# Patient Record
Sex: MALE | Race: WHITE | NOT HISPANIC OR LATINO
[De-identification: names, ages, dates, MRNs, and addresses within clinical notes are randomized per-mention and may not be internally consistent; named-entity substitution may affect disease eponyms.]

---

## 2024-02-05 ENCOUNTER — NON-APPOINTMENT (OUTPATIENT)
Age: 49
End: 2024-02-05

## 2024-03-18 ENCOUNTER — NON-APPOINTMENT (OUTPATIENT)
Age: 49
End: 2024-03-18

## 2024-05-09 ENCOUNTER — APPOINTMENT (OUTPATIENT)
Dept: ORTHOPEDIC SURGERY | Facility: CLINIC | Age: 49
End: 2024-05-09
Payer: COMMERCIAL

## 2024-05-09 VITALS — BODY MASS INDEX: 23.7 KG/M2 | HEIGHT: 72 IN | WEIGHT: 175 LBS

## 2024-05-09 DIAGNOSIS — Z78.9 OTHER SPECIFIED HEALTH STATUS: ICD-10-CM

## 2024-05-09 DIAGNOSIS — Z86.39 PERSONAL HISTORY OF OTHER ENDOCRINE, NUTRITIONAL AND METABOLIC DISEASE: ICD-10-CM

## 2024-05-09 DIAGNOSIS — S43.005A UNSPECIFIED DISLOCATION OF LEFT SHOULDER JOINT, INITIAL ENCOUNTER: ICD-10-CM

## 2024-05-09 DIAGNOSIS — M19.012 PRIMARY OSTEOARTHRITIS, LEFT SHOULDER: ICD-10-CM

## 2024-05-09 PROBLEM — Z00.00 ENCOUNTER FOR PREVENTIVE HEALTH EXAMINATION: Status: ACTIVE | Noted: 2024-05-09

## 2024-05-09 PROCEDURE — 99203 OFFICE O/P NEW LOW 30 MIN: CPT

## 2024-05-09 PROCEDURE — 73030 X-RAY EXAM OF SHOULDER: CPT | Mod: LT

## 2024-05-09 RX ORDER — ATORVASTATIN CALCIUM 80 MG/1
TABLET, FILM COATED ORAL
Refills: 0 | Status: ACTIVE | COMMUNITY

## 2024-05-09 NOTE — PHYSICAL EXAM
[de-identified] : Examination of the left shoulder is as follows:  INSPECTION: no swelling, no ecchymosis, no erythema, no atrophy, no deformity, no scapular winging. PALPATION: mildly ttp over anterolateral shoulder ROM: did not attempt due to recent dislocation STRENGTH: motor 5/5 ain/pin/ulnar  NEURO: motor and sensory intact distally.   X-rays of the left shoulder is as follows: Shoulder 2+ View: Moderate ac djd. There are no fractures, subluxations or dislocations. No significant abnormalities are seen.

## 2024-05-09 NOTE — ASSESSMENT
[FreeTextEntry1] : 49 yo RHD male presenting s/p left closed shoulder dislocation reduced at [facility] on 5/6/24. X-rays negative for abnormalities. Recommend non-surgical management at this time -LUE WBAT, d/c arm sling. Advised to avoid any overhead movements -Rx PT given today -Discussed with patient that if he continues to have persistent pain that he may be indicated for surgery, patient expressed understanding -Rest, ice, compression, elevation, NSAIDs PRN for pain.  -All questions answered -F/u 6 weeks  The diagnosis was explained in detail. The potential non-surgical and surgical treatments were reviewed. The relative risks and benefits of each option were considered relative to the patients age, activity level, medical history, symptom severity and previously attempted treatments.  The patient was advised to consult with their primary medical provider prior to initiation of any new medications to reduce the risk of adverse effects specific to their long-term home medications and medical history. The risk of gastrointestinal irritation and kidney injury specific to long-term NSAID use was discussed.  Entered by Papo De La Torre PA-C acting as scribe. Dr. Willis Attestation The documentation recorded by the scribe, in my presence, accurately reflects the service I, Dr. Willis, personally performed, and the decisions made by me with my edits as appropriate.

## 2024-05-09 NOTE — HISTORY OF PRESENT ILLNESS
[Sudden] : sudden [2] : 2 [0] : 0 [Dull/Aching] : dull/aching [Throbbing] : throbbing [Intermittent] : intermittent [Household chores] : household chores [Leisure] : leisure [Social interactions] : social interactions [Rest] : rest [Full time] : Work status: full time [de-identified] : Patient here for left shoulder. Patient reports that on 5/6/24 he was lifting a table up when he felt sudden pain in his left shoulder. Patient states that he went to [facility] on 5/6/24 where he was found to have a dislocated shoulder and [facility] ED staff was successful with closed reduction. Patient states that he has mild soreness in the anterior and lateral aspects of his shoulder. Patient presenting with arm sling on left shoulder. Reports two prior dislocations prior to current injury. Last dislocation approximately 10 years ago. Denies pain medications. [] : Post Surgical Visit: no [FreeTextEntry1] : Left shoulder  [FreeTextEntry3] : 5/6/24 [FreeTextEntry5] : Patient was lifting a table up when he felt sudden pain in his left shoulder. Patient states that he went to [facility] on 5/6/24  [de-identified] : Movement  [de-identified] : [facility] ED [de-identified] : 5/6/24 [de-identified] : X-rays of left shoulder at [facility] [de-identified[facility]

## 2024-06-20 ENCOUNTER — APPOINTMENT (OUTPATIENT)
Dept: ORTHOPEDIC SURGERY | Facility: CLINIC | Age: 49
End: 2024-06-20
Payer: COMMERCIAL

## 2024-06-20 DIAGNOSIS — S43.005D UNSPECIFIED DISLOCATION OF LEFT SHOULDER JOINT, SUBSEQUENT ENCOUNTER: ICD-10-CM

## 2024-06-20 PROCEDURE — 99213 OFFICE O/P EST LOW 20 MIN: CPT

## 2024-06-20 NOTE — PHYSICAL EXAM
[de-identified] : Examination of the left shoulder is as follows:  INSPECTION: no swelling, no ecchymosis, no erythema, no atrophy, no deformity, no scapular winging. PALPATION: no ttp over anterolateral shoulder ROM: Full rom without pain, neg apprehension test STRENGTH: motor 5/5 ain/pin/ulnar  NEURO: motor and sensory intact distally.

## 2024-06-20 NOTE — ASSESSMENT
[FreeTextEntry1] : 49 yo RHD male presenting with resolved left closed shoulder dislocation reduced at [facility] on 5/6/24. X-rays negative for abnormalities. Recommend continued non-surgical management at this time  -Discussed with patient that if he continues to have persistent pain that he may be indicated for surgery, patient expressed understanding -cont HEP -Rest, ice, compression, elevation, NSAIDs PRN for pain.  -All questions answered -F/u prn  The diagnosis was explained in detail. The potential non-surgical and surgical treatments were reviewed. The relative risks and benefits of each option were considered relative to the patients age, activity level, medical history, symptom severity and previously attempted treatments.  The patient was advised to consult with their primary medical provider prior to initiation of any new medications to reduce the risk of adverse effects specific to their long-term home medications and medical history. The risk of gastrointestinal irritation and kidney injury specific to long-term NSAID use was discussed.

## 2024-06-20 NOTE — HISTORY OF PRESENT ILLNESS
[Sudden] : sudden [0] : 0 [Rest] : rest [Full time] : Work status: full time [de-identified] : Patient here for left shoulder. Patient being treated for closed shoulder dislocation reduced at [facility] on 5/6/24. Patient states he went to PT for 2 weeks then started a HEP. Patient states he has no pain and no complaints at this time.  [] : Post Surgical Visit: no [FreeTextEntry1] : Left shoulder  [FreeTextEntry3] : 5/6/24 [FreeTextEntry5] : Patient was lifting a table up when he felt sudden pain in his left shoulder. Patient states that he went to [facility] on 5/6/24  [de-identified] : [facility] ED [de-identified] : 5/6/24 [de-identified] : X-rays of left shoulder at [facility] [de-identified] [facility]